# Patient Record
Sex: FEMALE | Race: WHITE | NOT HISPANIC OR LATINO | Employment: UNEMPLOYED | URBAN - METROPOLITAN AREA
[De-identification: names, ages, dates, MRNs, and addresses within clinical notes are randomized per-mention and may not be internally consistent; named-entity substitution may affect disease eponyms.]

---

## 2020-06-15 ENCOUNTER — TELEPHONE ENCOUNTER (OUTPATIENT)
Dept: URBAN - METROPOLITAN AREA CLINIC 9 | Facility: CLINIC | Age: 70
End: 2020-06-15

## 2020-08-04 ENCOUNTER — TELEPHONE ENCOUNTER (OUTPATIENT)
Dept: URBAN - METROPOLITAN AREA CLINIC 9 | Facility: CLINIC | Age: 70
End: 2020-08-04

## 2020-08-14 ENCOUNTER — TELEPHONE ENCOUNTER (OUTPATIENT)
Dept: URBAN - METROPOLITAN AREA CLINIC 9 | Facility: CLINIC | Age: 70
End: 2020-08-14

## 2020-10-20 ENCOUNTER — OFFICE VISIT (OUTPATIENT)
Dept: URBAN - METROPOLITAN AREA CLINIC 7 | Facility: CLINIC | Age: 70
End: 2020-10-20

## 2020-10-21 ENCOUNTER — OFFICE VISIT (OUTPATIENT)
Dept: URBAN - METROPOLITAN AREA CLINIC 7 | Facility: CLINIC | Age: 70
End: 2020-10-21

## 2020-11-18 ENCOUNTER — TELEPHONE ENCOUNTER (OUTPATIENT)
Dept: URBAN - METROPOLITAN AREA CLINIC 9 | Facility: CLINIC | Age: 70
End: 2020-11-18

## 2020-12-14 ENCOUNTER — LAB OUTSIDE AN ENCOUNTER (OUTPATIENT)
Age: 70
End: 2020-12-14

## 2021-08-01 ENCOUNTER — OFFICE VISIT (OUTPATIENT)
Age: 71
End: 2021-08-01

## 2021-10-21 ENCOUNTER — TELEPHONE ENCOUNTER (OUTPATIENT)
Dept: URBAN - METROPOLITAN AREA CLINIC 9 | Facility: CLINIC | Age: 71
End: 2021-10-21

## 2021-11-17 ENCOUNTER — OFFICE VISIT (OUTPATIENT)
Dept: URBAN - METROPOLITAN AREA CLINIC 7 | Facility: CLINIC | Age: 71
End: 2021-11-17

## 2021-11-22 ENCOUNTER — TELEPHONE ENCOUNTER (OUTPATIENT)
Dept: URBAN - METROPOLITAN AREA CLINIC 9 | Facility: CLINIC | Age: 71
End: 2021-11-22

## 2022-01-05 ENCOUNTER — TELEPHONE ENCOUNTER (OUTPATIENT)
Dept: URBAN - METROPOLITAN AREA CLINIC 9 | Facility: CLINIC | Age: 72
End: 2022-01-05

## 2022-01-19 ENCOUNTER — LAB OUTSIDE AN ENCOUNTER (OUTPATIENT)
Age: 72
End: 2022-01-19

## 2022-07-30 ENCOUNTER — TELEPHONE ENCOUNTER (OUTPATIENT)
Age: 72
End: 2022-07-30

## 2022-07-30 RX ORDER — OCTREOTIDE ACETATE 50 UG/ML
1 (ONE) INJECTION, SOLUTION INTRAVENOUS; SUBCUTANEOUS
Qty: 0 | Refills: 2 | OUTPATIENT
Start: 2020-05-01 | End: 2020-05-31

## 2022-07-31 ENCOUNTER — TELEPHONE ENCOUNTER (OUTPATIENT)
Age: 72
End: 2022-07-31

## 2022-07-31 RX ORDER — OCTREOTIDE ACETATE 50 UG/ML
1 (ONE) INJECTION, SOLUTION INTRAVENOUS; SUBCUTANEOUS
Qty: 0 | Refills: 2 | Status: ACTIVE | COMMUNITY
Start: 2020-05-01

## 2022-08-18 ENCOUNTER — TELEPHONE ENCOUNTER (OUTPATIENT)
Dept: URBAN - METROPOLITAN AREA CLINIC 7 | Facility: CLINIC | Age: 72
End: 2022-08-18

## 2022-08-22 ENCOUNTER — TELEPHONE ENCOUNTER (OUTPATIENT)
Dept: URBAN - METROPOLITAN AREA CLINIC 7 | Facility: CLINIC | Age: 72
End: 2022-08-22

## 2022-09-02 ENCOUNTER — TELEPHONE ENCOUNTER (OUTPATIENT)
Dept: URBAN - METROPOLITAN AREA CLINIC 7 | Facility: CLINIC | Age: 72
End: 2022-09-02

## 2022-09-12 ENCOUNTER — TELEPHONE ENCOUNTER (OUTPATIENT)
Dept: URBAN - METROPOLITAN AREA CLINIC 7 | Facility: CLINIC | Age: 72
End: 2022-09-12

## 2022-09-15 ENCOUNTER — OFFICE VISIT (OUTPATIENT)
Dept: URBAN - METROPOLITAN AREA CLINIC 7 | Facility: CLINIC | Age: 72
End: 2022-09-15

## 2022-09-15 ENCOUNTER — TELEPHONE ENCOUNTER (OUTPATIENT)
Dept: URBAN - METROPOLITAN AREA CLINIC 7 | Facility: CLINIC | Age: 72
End: 2022-09-15

## 2022-09-15 RX ORDER — OCTREOTIDE ACETATE 50 UG/ML
1 (ONE) INJECTION, SOLUTION INTRAVENOUS; SUBCUTANEOUS
Qty: 0 | Refills: 2 | Status: ACTIVE | COMMUNITY
Start: 2020-05-01

## 2022-10-31 ENCOUNTER — OFFICE VISIT (OUTPATIENT)
Dept: URBAN - METROPOLITAN AREA CLINIC 9 | Facility: CLINIC | Age: 72
End: 2022-10-31

## 2022-11-16 ENCOUNTER — OFFICE VISIT (OUTPATIENT)
Dept: URBAN - METROPOLITAN AREA CLINIC 9 | Facility: CLINIC | Age: 72
End: 2022-11-16
Payer: MEDICARE

## 2022-11-16 VITALS
BODY MASS INDEX: 24.1 KG/M2 | WEIGHT: 136 LBS | SYSTOLIC BLOOD PRESSURE: 118 MMHG | DIASTOLIC BLOOD PRESSURE: 76 MMHG | HEIGHT: 63 IN

## 2022-11-16 DIAGNOSIS — D64.9 ANEMIA, UNSPECIFIED TYPE: ICD-10-CM

## 2022-11-16 DIAGNOSIS — D61.818 PANCYTOPENIA: ICD-10-CM

## 2022-11-16 PROBLEM — 87522002: Status: ACTIVE | Noted: 2022-11-16

## 2022-11-16 PROCEDURE — 99213 OFFICE O/P EST LOW 20 MIN: CPT | Performed by: STUDENT IN AN ORGANIZED HEALTH CARE EDUCATION/TRAINING PROGRAM

## 2022-11-16 RX ORDER — OCTREOTIDE ACETATE 50 UG/ML
1 (ONE) INJECTION, SOLUTION INTRAVENOUS; SUBCUTANEOUS
Qty: 0 | Refills: 2 | Status: ON HOLD | COMMUNITY
Start: 2020-05-01

## 2022-11-16 RX ORDER — LEVOTHYROXINE SODIUM 150 UG/1
1 TABLET IN THE MORNING ON AN EMPTY STOMACH TABLET ORAL BID
Qty: 60 TABLET | Status: ACTIVE | COMMUNITY
Start: 2022-11-16

## 2022-11-16 NOTE — HPI-TODAY'S VISIT:
Patient has a history of UC/Crohns since 1980s with near total small bowel resection/fistula repair w/ end jejunostomy in  2018 and 2020 c/b short gut syndrome on TPN and octreotide managed by [place], [place], 3 infections in TPN line (recent infxn 10/21/22).  who presents for follow up after recent hospitalization.  Onc: Brittney Calhounraquel ([facility])  Anatomy- intact stomach, duodenum, 15cm of small bowel to end jejunostomy, -ileum, -colon, -rectum, - anus  GI Hx: Seen by Dr. Bhakta last time. Was in [facility] ([place]) for [place]PN line infection for 1 week was discharged 10/21/22.  10/31/22- labs-- Alb 2.7, T bili 1.3, Alk 118, AST 54, ALT 27, CBC 2.76>9.0< 128, MCV 91, Fe 119, TIBC 362, 32.9%, Ferritin 250, , Vitamin A 0.16,  Bone marrow bx 11/8/22- fragments of bone with scant marrow fibrosis and chronic inflammation. Presumed due to nutrition vs. line infection. Overall feels improved since last hospitalization. No blood or increase/decreased output in ostomy. States that she needs iron transfusion. Recent iron studies drawn yesterday.  Denies current fever, chills, abdominal pain, nausea, vomiting, diarrhea.  Denies dysphagia, reflux, UGI symptoms. Denies hematemesis, melena, hematochezia, blood per rectum.  Prior on gattex. Prior on octreotide.  Imaging/Studies/Procedures: CT A/P 4/2020- localized narrowing of the loop of bowel extneding into the ostomy in LLQ concerning for partial obstuction at level of ostomy. Reflux of contrast into ostomy,  9/2022- labs- WBC 2.8, Hgb 7.7 (stbale from 8s), Plt 82, BUN 27, Ca 7.6, Alb 2.4, AST 79/ALT 46/T bili 1.1, Fe 103, 26%, Ferritin 14.2, Zinc 49 (L), selenium 113, Copper 76, chromium 2.0, Manganese 1.4 (H), 10/31/22- labs-- Alb 2.7, T bili 1.3, Alk 118, AST 54, ALT 27, CBC 2.76>9.0< 128, MCV 91, Fe 119, TIBC 362, 32.9%, Ferritin 250, , Vitamin A 0.16,  Bone marrow bx 11/8/22- fragments of bone with scant marrow fibrosis and chronic inflammation.

## 2022-11-28 ENCOUNTER — OFFICE VISIT (OUTPATIENT)
Dept: URBAN - METROPOLITAN AREA CLINIC 9 | Facility: CLINIC | Age: 72
End: 2022-11-28

## 2022-12-12 ENCOUNTER — OFFICE VISIT (OUTPATIENT)
Dept: URBAN - METROPOLITAN AREA CLINIC 9 | Facility: CLINIC | Age: 72
End: 2022-12-12
Payer: MEDICARE

## 2022-12-12 ENCOUNTER — WEB ENCOUNTER (OUTPATIENT)
Dept: URBAN - METROPOLITAN AREA CLINIC 9 | Facility: CLINIC | Age: 72
End: 2022-12-12

## 2022-12-12 ENCOUNTER — TELEPHONE ENCOUNTER (OUTPATIENT)
Dept: URBAN - METROPOLITAN AREA CLINIC 7 | Facility: CLINIC | Age: 72
End: 2022-12-12

## 2022-12-12 VITALS
DIASTOLIC BLOOD PRESSURE: 68 MMHG | BODY MASS INDEX: 24.27 KG/M2 | HEIGHT: 63 IN | WEIGHT: 137 LBS | SYSTOLIC BLOOD PRESSURE: 108 MMHG

## 2022-12-12 DIAGNOSIS — D61.818 PANCYTOPENIA: ICD-10-CM

## 2022-12-12 DIAGNOSIS — D64.9 ANEMIA, UNSPECIFIED TYPE: ICD-10-CM

## 2022-12-12 DIAGNOSIS — R79.89 ELEVATED LIVER FUNCTION TESTS: ICD-10-CM

## 2022-12-12 DIAGNOSIS — R74.8 ELEVATED LIVER ENZYMES: ICD-10-CM

## 2022-12-12 DIAGNOSIS — R79.9: ICD-10-CM

## 2022-12-12 DIAGNOSIS — R79.0: ICD-10-CM

## 2022-12-12 DIAGNOSIS — R74.01 ELEVATED LIVER TRANSAMINASE LEVEL: ICD-10-CM

## 2022-12-12 DIAGNOSIS — E60 LOW ZINC LEVEL: ICD-10-CM

## 2022-12-12 PROBLEM — 274866006: Status: ACTIVE | Noted: 2022-12-12

## 2022-12-12 PROBLEM — 271737000: Status: ACTIVE | Noted: 2022-11-16

## 2022-12-12 PROBLEM — 2181000119100: Status: ACTIVE | Noted: 2022-12-12

## 2022-12-12 PROCEDURE — 99214 OFFICE O/P EST MOD 30 MIN: CPT | Performed by: STUDENT IN AN ORGANIZED HEALTH CARE EDUCATION/TRAINING PROGRAM

## 2022-12-12 RX ORDER — LEVOTHYROXINE SODIUM 150 UG/1
1 TABLET IN THE MORNING ON AN EMPTY STOMACH TABLET ORAL BID
Qty: 60 TABLET | Status: ACTIVE | COMMUNITY
Start: 2022-11-16

## 2022-12-12 RX ORDER — OCTREOTIDE ACETATE 50 UG/ML
1 (ONE) INJECTION, SOLUTION INTRAVENOUS; SUBCUTANEOUS
Qty: 0 | Refills: 2 | Status: ON HOLD | COMMUNITY
Start: 2020-05-01

## 2022-12-12 NOTE — HPI-TODAY'S VISIT:
Patient has a history of UC/Crohns since 1980s with near total small bowel resection/fistula repair w/ end jejunostomy in  2018 and 2020 c/b short gut syndrome on TPN and octreotide managed by [place], [place], 3 infections in TPN line (recent infxn 10/21/22), pancytopenia likel 2/2 anemia of chronic disease vs. nutritional def, elevated LFTs (12/2022),  who presents for follow up.  Onc: Brittney Moran ([facility]) Hematologist: Dr. Mcleod (Office number: [phone], Fax: [phone])- next appt 12/20/22  Anatomy- intact stomach, duodenum, 15cm of small bowel to end jejunostomy, -ileum, -colon, -rectum, - anus  GI Hx: 12/12/22- Has some fatigue. Labs appear improved (panyctopenia). She appears to have some nutritional deficiencies/high levels: zinc low (50). Copper 74. chromium 3.0 (H), manganese 0.9 (H).-- will fax over note to TPN physician. Othwerwise doing well.  Denies current fever, chills, abdominal pain, nausea, vomiting, diarrhea.  Denies dysphagia, reflux, UGI symptoms. Denies hematemesis, melena, hematochezia, blood per rectum.  Prior on gattex. Prior on octreotide.  Imaging/Studies/Procedures: CT A/P 4/2020- localized narrowing of the loop of bowel extneding into the ostomy in LLQ concerning for partial obstuction at level of ostomy. Reflux of contrast into ostomy,  9/2022- labs- WBC 2.8, Hgb 7.7 (stbale from 8s), Plt 82, BUN 27, Ca 7.6, Alb 2.4, AST 79/ALT 46/T bili 1.1, Fe 103, 26%, Ferritin 14.2, Zinc 49 (L), selenium 113, Copper 76, chromium 2.0, Manganese 1.4 (H), Was in [facility] ([place]) for [place]PN line infection for 1 week was discharged 10/21/22. Line exchanged at that time. 10/31/22- labs-- Alb 2.7, T bili 1.3, Alk 118, AST 54, ALT 27, CBC 2.76>9.0< 128, MCV 91, Fe 119, TIBC 362, 32.9%, Ferritin 250, , Vitamin A 0.16,  Bone marrow bx 11/8/22- fragments of bone with scant marrow fibrosis and chronic inflammation. 11/8/22- TSH normal. 12/5/22- WBC 3.4, Hgb 10.2, Plt 111 (all improved since last visit). Alb 3.3 (higher).  (H), ALT 58 (H), Alk 175 (H), T bili 1.7 (H)- higher than last labs. zinc low (50). Copper 74. chromium 3.0 (H), manganese 0.9 (H).

## 2023-02-13 ENCOUNTER — TELEPHONE ENCOUNTER (OUTPATIENT)
Dept: URBAN - METROPOLITAN AREA CLINIC 9 | Facility: CLINIC | Age: 73
End: 2023-02-13

## 2023-03-05 PROBLEM — 863927004: Status: ACTIVE | Noted: 2022-12-12

## 2023-03-05 PROBLEM — 127034005: Status: ACTIVE | Noted: 2022-11-16

## 2023-03-05 PROBLEM — 47563007: Status: ACTIVE | Noted: 2023-03-05

## 2023-03-05 PROBLEM — 707724006: Status: ACTIVE | Noted: 2022-12-12

## 2023-03-05 PROBLEM — 785671009: Status: ACTIVE | Noted: 2022-12-12

## 2023-03-06 ENCOUNTER — DASHBOARD ENCOUNTERS (OUTPATIENT)
Age: 73
End: 2023-03-06

## 2023-03-06 ENCOUNTER — OFFICE VISIT (OUTPATIENT)
Dept: URBAN - METROPOLITAN AREA CLINIC 9 | Facility: CLINIC | Age: 73
End: 2023-03-06
Payer: MEDICARE

## 2023-03-06 ENCOUNTER — WEB ENCOUNTER (OUTPATIENT)
Dept: URBAN - METROPOLITAN AREA CLINIC 9 | Facility: CLINIC | Age: 73
End: 2023-03-06

## 2023-03-06 VITALS
HEIGHT: 63 IN | SYSTOLIC BLOOD PRESSURE: 120 MMHG | BODY MASS INDEX: 24.45 KG/M2 | DIASTOLIC BLOOD PRESSURE: 70 MMHG | WEIGHT: 138 LBS

## 2023-03-06 DIAGNOSIS — R74.01 ELEVATED LIVER TRANSAMINASE LEVEL: ICD-10-CM

## 2023-03-06 DIAGNOSIS — R79.89 ELEVATED LIVER FUNCTION TESTS: ICD-10-CM

## 2023-03-06 DIAGNOSIS — R74.8 ELEVATED LIVER ENZYMES: ICD-10-CM

## 2023-03-06 DIAGNOSIS — K91.2 SHORT GUT SYNDROME: ICD-10-CM

## 2023-03-06 DIAGNOSIS — D61.818 PANCYTOPENIA: ICD-10-CM

## 2023-03-06 DIAGNOSIS — Z78.9 ON TOTAL PARENTERAL NUTRITION (TPN): ICD-10-CM

## 2023-03-06 DIAGNOSIS — E63.9 NUTRITIONAL DEFICIENCY: ICD-10-CM

## 2023-03-06 PROCEDURE — 99214 OFFICE O/P EST MOD 30 MIN: CPT | Performed by: STUDENT IN AN ORGANIZED HEALTH CARE EDUCATION/TRAINING PROGRAM

## 2023-03-06 RX ORDER — OCTREOTIDE ACETATE 50 UG/ML
1 (ONE) INJECTION, SOLUTION INTRAVENOUS; SUBCUTANEOUS
Qty: 0 | Refills: 2 | Status: ON HOLD | COMMUNITY
Start: 2020-05-01

## 2023-03-06 RX ORDER — LEVOTHYROXINE SODIUM 150 UG/1
1 TABLET IN THE MORNING ON AN EMPTY STOMACH TABLET ORAL BID
Qty: 60 TABLET | Status: ACTIVE | COMMUNITY
Start: 2022-11-16

## 2023-03-06 NOTE — HPI-TODAY'S VISIT:
Patient has a history of UC/Crohns since 1980s with near total small bowel resection/fistula repair w/ end jejunostomy in  2018 and 2020 c/b short gut syndrome on TPN and octreotide managed by [facility], [place], 3 infections in TPN line (recent infxn 10/21/22), pancytopenia likel 2/2 anemia of chronic disease vs. nutritional def, elevated LFTs (12/2022),  who presents for follow up.  Onc: Brittney Moran ([facility]) Hematologist: Dr. Mcleod (Office number: [phone], Fax: [phone])- next appt 12/20/22  Anatomy- intact stomach, duodenum, 15cm of small bowel to end jejunostomy, -ileum, -colon, -rectum, - anus  GI Hx: 12/12/22- Has some fatigue. Labs appear improved (panyctopenia). She appears to have some nutritional deficiencies/high levels: zinc low (50). Copper 74. chromium 3.0 (H), manganese 0.9 (H).-- will fax over note to TPN physician. Othwerwise doing well. 3/2023- Had some questions regarding RUQ U/S. Wanted to know why it was important. I explained that her LFTs are persistently elevated, given she is on TPN it can be TPN induced hepatitis, but it can also be sludge/stone which risks are increased when you are on TPN. Otherwise feeling well.  Denies current fever, chills, abdominal pain, nausea, vomiting, diarrhea.  Denies dysphagia, reflux, UGI symptoms. Denies hematemesis, melena, hematochezia, blood per rectum.  For short gut: Prior on gattex. Prior on octreotide.  Imaging/Studies/Procedures: CT A/P 4/2020- localized narrowing of the loop of bowel extneding into the ostomy in LLQ concerning for partial obstuction at level of ostomy. Reflux of contrast into ostomy,  9/2022- labs- WBC 2.8, Hgb 7.7 (stbale from 8s), Plt 82, BUN 27, Ca 7.6, Alb 2.4, AST 79/ALT 46/T bili 1.1, Fe 103, 26%, Ferritin 14.2, Zinc 49 (L), selenium 113, Copper 76, chromium 2.0, Manganese 1.4 (H), Was in [facility] ([place]) for [place]PN line infection for 1 week was discharged 10/21/22. Line exchanged at that time. 10/31/22- labs-- Alb 2.7, T bili 1.3, Alk 118, AST 54, ALT 27, CBC 2.76>9.0< 128, MCV 91, Fe 119, TIBC 362, 32.9%, Ferritin 250, , Vitamin A 0.16,  Bone marrow bx 11/8/22- fragments of bone with scant marrow fibrosis and chronic inflammation. 11/8/22- TSH normal. 12/5/22- WBC 3.4, Hgb 10.2, Plt 111 (all improved since last visit). Alb 3.3 (higher).  (H), ALT 58 (H), Alk 175 (H), T bili 1.7 (H)- higher than last labs. zinc low (50). Copper 74. chromium 3.0 (H), manganese 0.9 (H). 2/13/23- WBC 2.9, Hgb 11.1, Plt 71, CMP with BUN 23, Alk phos 147, AST 74, ALT 42, T bili 2.3. Selenium normla. Vitamin A low (23),

## (undated) LAB
BLOOD COUNT, PLATELET, AUTOMATED: (no result)
BLOOD COUNT, PLATELET, AUTOMATED: (no result)
GRANULOCYTE %: (no result)
GRANULOCYTE %: (no result)
HCT (HEMATOCRIT): (no result)
HCT (HEMATOCRIT): (no result)
HGB (HEMOGLOBIN): (no result)
HGB (HEMOGLOBIN): (no result)
LYMPHOCYTES, TOTAL: (no result)
LYMPHOCYTES, TOTAL: (no result)
MCH (MEAN CORPUSCULAR HEMOGLOBIN): (no result)
MCH (MEAN CORPUSCULAR HEMOGLOBIN): (no result)
MCHC (MEAN CORPUSCULAR HEMOGLOBIN CONCENTRATI: (no result)
MCHC (MEAN CORPUSCULAR HEMOGLOBIN CONCENTRATI: (no result)
MCV (MEAN CORPUSCULAR VOLUME): (no result)
MCV (MEAN CORPUSCULAR VOLUME): (no result)
MONOCYTES: (no result)
MONOCYTES: (no result)
RBC: (no result)
RBC: (no result)
WBC: (no result)
WBC: (no result)